# Patient Record
Sex: MALE | Race: WHITE | NOT HISPANIC OR LATINO | URBAN - METROPOLITAN AREA
[De-identification: names, ages, dates, MRNs, and addresses within clinical notes are randomized per-mention and may not be internally consistent; named-entity substitution may affect disease eponyms.]

---

## 2018-10-21 ENCOUNTER — EMERGENCY (EMERGENCY)
Facility: HOSPITAL | Age: 78
LOS: 1 days | Discharge: HOME | End: 2018-10-21
Attending: EMERGENCY MEDICINE | Admitting: EMERGENCY MEDICINE
Payer: COMMERCIAL

## 2018-10-21 VITALS
HEART RATE: 68 BPM | TEMPERATURE: 97 F | OXYGEN SATURATION: 98 % | SYSTOLIC BLOOD PRESSURE: 129 MMHG | RESPIRATION RATE: 16 BRPM | DIASTOLIC BLOOD PRESSURE: 75 MMHG

## 2018-10-21 VITALS
SYSTOLIC BLOOD PRESSURE: 147 MMHG | OXYGEN SATURATION: 98 % | HEART RATE: 60 BPM | RESPIRATION RATE: 18 BRPM | TEMPERATURE: 98 F | DIASTOLIC BLOOD PRESSURE: 85 MMHG

## 2018-10-21 PROCEDURE — 99284 EMERGENCY DEPT VISIT MOD MDM: CPT

## 2018-10-21 PROCEDURE — 74176 CT ABD & PELVIS W/O CONTRAST: CPT | Mod: 26

## 2018-10-21 RX ORDER — DIAZEPAM 5 MG
5 TABLET ORAL ONCE
Qty: 0 | Refills: 0 | Status: DISCONTINUED | OUTPATIENT
Start: 2018-10-21 | End: 2018-10-21

## 2018-10-21 RX ORDER — CYCLOBENZAPRINE HYDROCHLORIDE 10 MG/1
1 TABLET, FILM COATED ORAL
Qty: 6 | Refills: 0 | OUTPATIENT
Start: 2018-10-21 | End: 2018-10-23

## 2018-10-21 RX ORDER — CYCLOBENZAPRINE HYDROCHLORIDE 10 MG/1
10 TABLET, FILM COATED ORAL ONCE
Qty: 0 | Refills: 0 | Status: COMPLETED | OUTPATIENT
Start: 2018-10-21 | End: 2018-10-21

## 2018-10-21 RX ORDER — ACETAMINOPHEN WITH CODEINE 300MG-30MG
1 TABLET ORAL EVERY 4 HOURS
Qty: 0 | Refills: 0 | Status: DISCONTINUED | OUTPATIENT
Start: 2018-10-21 | End: 2018-10-21

## 2018-10-21 RX ORDER — ACETAMINOPHEN WITH CODEINE 300MG-30MG
1 TABLET ORAL ONCE
Qty: 0 | Refills: 0 | Status: DISCONTINUED | OUTPATIENT
Start: 2018-10-21 | End: 2018-10-21

## 2018-10-21 RX ADMIN — Medication 5 MILLIGRAM(S): at 14:37

## 2018-10-21 RX ADMIN — CYCLOBENZAPRINE HYDROCHLORIDE 10 MILLIGRAM(S): 10 TABLET, FILM COATED ORAL at 15:48

## 2018-10-21 RX ADMIN — Medication 1 TABLET(S): at 14:37

## 2018-10-21 RX ADMIN — Medication 5 MILLIGRAM(S): at 16:59

## 2018-10-21 RX ADMIN — Medication 1 TABLET(S): at 16:15

## 2018-10-21 RX ADMIN — Medication 1 TABLET(S): at 16:59

## 2018-10-21 NOTE — ED ADULT NURSE NOTE - CAS EDN DISCHARGE ASSESSMENT
Alert and oriented to person, place and time/felt better after 2nd dose of pain meds/Symptoms improved/Awake

## 2018-10-21 NOTE — ED PROVIDER NOTE - NEUROLOGICAL, MLM
Alert and oriented, no focal deficits, no motor or sensory deficits.  5/5 strength to B/L lower extremities with pain on L straight leg raise.

## 2018-10-21 NOTE — ED ADULT NURSE NOTE - CHIEF COMPLAINT
The patient is a 77y Male complaining of back pain/injury. pregnant & post-partum women during each pregancy irregardless of the patient's prior history of receiving Tdap to maximize the maternal antibody response and passive antibody transfer to the infant

## 2018-10-21 NOTE — ED PROVIDER NOTE - OBJECTIVE STATEMENT
76 y/o M with a PMHx of HLD, PE and DVT, GERD, HTN, achilles tendonitis, a Tetracycline allergy presents to ED c/o back spasms in his L lower back. Pt states that he went out to the park earlier, was standing ~1.5 hrs and then went into an uber sitting down with his knee bent. Thereafter walked to a racket club and suddenly experienced back spasms. Currently notes mild improvement of sx. 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 Denies fevers, chills, urinary sx, numbness, tingling, weakness, trauma to the area.    Medications: Xarelto 15 mg, Losartan 50 mg, Omeprazole 20 mg, Allopurinol 300 mg, Atorvastatin 20 mg, Tamsulosin 0.4 mg. 78 y/o M with a PMHx of HLD, PE and DVT, GERD, HTN, achilles tendonitis, a Tetracycline allergy presents to ED c/o back spasms in his L lower back. Pt states that he went out to the park earlier, was standing ~1.5 hrs and then went into an uber sitting down with his knee bent. Thereafter walked to a racket club and suddenly experienced back spasms. Currently notes mild improvement of sx. 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 Denies fevers, chills, urinary sx, numbness, tingling, weakness, trauma to the area.    PMD: Yordy Arshad at [place]  Medications: Xarelto 15 mg, Losartan 50 mg, Omeprazole 20 mg, Allopurinol 300 mg, Atorvastatin 20 mg, Tamsulosin 0.4 mg. 76 y/o M with a PMHx of HLD, PE and DVT, GERD, HTN, achilles tendonitis, a Tetracycline allergy presents to ED c/o back spasms in his L lower back. Pt states that he went out to the park earlier, was standing ~1.5 hrs and then went into an uber sitting down with his knee bent. Thereafter walked to a racket club and suddenly experienced back spasms. Currently notes mild improvement of sx. 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 Denies fevers, chills, urinary sx, numbness, tingling, weakness, exertion, trauma to the area.    PMD: Yordy Arshad at [place]  Medications: Xarelto 15 mg, Losartan 50 mg, Omeprazole 20 mg, Allopurinol 300 mg, Atorvastatin 20 mg, Tamsulosin 0.4 mg. 78 y/o M with a PMHx of HLD, PE and DVT, GERD, HTN, achilles tendonitis, a Tetracycline allergy presents to ED c/o back spasms in his L lower back. Pt states that he went out to the park earlier, was standing ~1.5 hrs and then went into an uber sitting down with his knee bent. Thereafter walked to a racket club and suddenly experienced back spasms. Currently notes mild improvement of sx. 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 Denies fevers, chills, urinary sx, numbness, tingling, weakness, exertion, trauma to the area.    PMD: Yordy Arshad at [place]  Medications: Xarelto 15 mg, Losartan 50 mg, Omeprazole 20 mg, Allopurinol 300 mg, Atorvastatin 20 mg, Tamsulosin 0.4 mg.

## 2018-10-21 NOTE — ED ADULT NURSE NOTE - OBJECTIVE STATEMENT
Pt with c/o Lt lower back spasm. Was walking down street and had to lower himself to the ground because he couldn't bearing own wait.

## 2018-10-21 NOTE — ED PROVIDER NOTE - PMH
DVT (deep venous thrombosis)    GERD (gastroesophageal reflux disease)    HLD (hyperlipidemia)    HTN (hypertension)    Pulmonary embolism

## 2018-10-21 NOTE — ED ADULT NURSE NOTE - NSIMPLEMENTINTERV_GEN_ALL_ED
Implemented All Fall Risk Interventions:  [place] to call system. Call bell, personal items and telephone within reach. Instruct patient to call for assistance. Room bathroom lighting operational. Non-slip footwear when patient is off stretcher. Physically safe environment: no spills, clutter or unnecessary equipment. Stretcher in lowest position, wheels locked, appropriate side rails in place. Provide visual cue, wrist band, yellow gown, etc. Monitor gait and stability. Monitor for mental status changes and reorient to person, place, and time. Review medications for side effects contributing to fall risk. Reinforce activity limits and safety measures with patient and family.

## 2018-10-25 DIAGNOSIS — I10 ESSENTIAL (PRIMARY) HYPERTENSION: ICD-10-CM

## 2018-10-25 DIAGNOSIS — E78.5 HYPERLIPIDEMIA, UNSPECIFIED: ICD-10-CM

## 2018-10-25 DIAGNOSIS — M54.42 LUMBAGO WITH SCIATICA, LEFT SIDE: ICD-10-CM

## 2018-10-25 DIAGNOSIS — Z88.8 ALLERGY STATUS TO OTHER DRUGS, MEDICAMENTS AND BIOLOGICAL SUBSTANCES: ICD-10-CM

## 2018-10-25 DIAGNOSIS — M54.5 LOW BACK PAIN: ICD-10-CM

## 2019-04-10 ENCOUNTER — EMERGENCY (EMERGENCY)
Facility: HOSPITAL | Age: 79
LOS: 1 days | Discharge: HOME | End: 2019-04-10
Attending: EMERGENCY MEDICINE | Admitting: EMERGENCY MEDICINE
Payer: COMMERCIAL

## 2019-04-10 VITALS
TEMPERATURE: 98 F | SYSTOLIC BLOOD PRESSURE: 152 MMHG | HEART RATE: 85 BPM | DIASTOLIC BLOOD PRESSURE: 92 MMHG | OXYGEN SATURATION: 95 % | RESPIRATION RATE: 18 BRPM

## 2019-04-10 DIAGNOSIS — I10 ESSENTIAL (PRIMARY) HYPERTENSION: ICD-10-CM

## 2019-04-10 DIAGNOSIS — I25.10 ATHEROSCLEROTIC HEART DISEASE OF NATIVE CORONARY ARTERY WITHOUT ANGINA PECTORIS: ICD-10-CM

## 2019-04-10 DIAGNOSIS — R07.9 CHEST PAIN, UNSPECIFIED: ICD-10-CM

## 2019-04-10 DIAGNOSIS — Z88.8 ALLERGY STATUS TO OTHER DRUGS, MEDICAMENTS AND BIOLOGICAL SUBSTANCES STATUS: ICD-10-CM

## 2019-04-10 DIAGNOSIS — R68.84 JAW PAIN: ICD-10-CM

## 2019-04-10 DIAGNOSIS — Z79.899 OTHER LONG TERM (CURRENT) DRUG THERAPY: ICD-10-CM

## 2019-04-10 DIAGNOSIS — M79.601 PAIN IN RIGHT ARM: ICD-10-CM

## 2019-04-10 DIAGNOSIS — E78.5 HYPERLIPIDEMIA, UNSPECIFIED: ICD-10-CM

## 2019-04-10 DIAGNOSIS — M25.521 PAIN IN RIGHT ELBOW: ICD-10-CM

## 2019-04-10 DIAGNOSIS — R07.89 OTHER CHEST PAIN: ICD-10-CM

## 2019-04-10 DIAGNOSIS — M25.511 PAIN IN RIGHT SHOULDER: ICD-10-CM

## 2019-04-10 LAB
ALBUMIN SERPL ELPH-MCNC: 4.2 G/DL — SIGNIFICANT CHANGE UP (ref 3.4–5)
ALP SERPL-CCNC: 68 U/L — SIGNIFICANT CHANGE UP (ref 40–120)
ALT FLD-CCNC: 52 U/L — HIGH (ref 12–42)
ANION GAP SERPL CALC-SCNC: 12 MMOL/L — SIGNIFICANT CHANGE UP (ref 9–16)
APTT BLD: 26.6 SEC — LOW (ref 27.5–36.3)
AST SERPL-CCNC: 36 U/L — SIGNIFICANT CHANGE UP (ref 15–37)
BASOPHILS NFR BLD AUTO: 0.6 % — SIGNIFICANT CHANGE UP (ref 0–2)
BILIRUB SERPL-MCNC: 0.5 MG/DL — SIGNIFICANT CHANGE UP (ref 0.2–1.2)
BUN SERPL-MCNC: 31 MG/DL — HIGH (ref 7–23)
CALCIUM SERPL-MCNC: 9.9 MG/DL — SIGNIFICANT CHANGE UP (ref 8.5–10.5)
CHLORIDE SERPL-SCNC: 106 MMOL/L — SIGNIFICANT CHANGE UP (ref 96–108)
CO2 SERPL-SCNC: 26 MMOL/L — SIGNIFICANT CHANGE UP (ref 22–31)
CREAT SERPL-MCNC: 1.62 MG/DL — HIGH (ref 0.5–1.3)
D DIMER BLD IA.RAPID-MCNC: 201 NG/ML DDU — SIGNIFICANT CHANGE UP
EOSINOPHIL NFR BLD AUTO: 4.1 % — SIGNIFICANT CHANGE UP (ref 0–6)
GLUCOSE SERPL-MCNC: 97 MG/DL — SIGNIFICANT CHANGE UP (ref 70–99)
HCT VFR BLD CALC: 40.3 % — SIGNIFICANT CHANGE UP (ref 39–50)
HGB BLD-MCNC: 13.3 G/DL — SIGNIFICANT CHANGE UP (ref 13–17)
IMM GRANULOCYTES NFR BLD AUTO: 0.3 % — SIGNIFICANT CHANGE UP (ref 0–1.5)
INR BLD: 0.93 — SIGNIFICANT CHANGE UP (ref 0.88–1.16)
LYMPHOCYTES # BLD AUTO: 38.7 % — SIGNIFICANT CHANGE UP (ref 13–44)
MCHC RBC-ENTMCNC: 32 PG — SIGNIFICANT CHANGE UP (ref 27–34)
MCHC RBC-ENTMCNC: 33 G/DL — SIGNIFICANT CHANGE UP (ref 32–36)
MCV RBC AUTO: 96.9 FL — SIGNIFICANT CHANGE UP (ref 80–100)
MONOCYTES NFR BLD AUTO: 9.9 % — SIGNIFICANT CHANGE UP (ref 2–14)
NEUTROPHILS NFR BLD AUTO: 46.4 % — SIGNIFICANT CHANGE UP (ref 43–77)
PLATELET # BLD AUTO: 162 K/UL — SIGNIFICANT CHANGE UP (ref 150–400)
POTASSIUM SERPL-MCNC: 3.9 MMOL/L — SIGNIFICANT CHANGE UP (ref 3.5–5.3)
POTASSIUM SERPL-SCNC: 3.9 MMOL/L — SIGNIFICANT CHANGE UP (ref 3.5–5.3)
PROT SERPL-MCNC: 7 G/DL — SIGNIFICANT CHANGE UP (ref 6.4–8.2)
PROTHROM AB SERPL-ACNC: 10.3 SEC — SIGNIFICANT CHANGE UP (ref 10–12.9)
RBC # BLD: 4.16 M/UL — LOW (ref 4.2–5.8)
RBC # FLD: 14.8 % — HIGH (ref 10.3–14.5)
SODIUM SERPL-SCNC: 144 MMOL/L — SIGNIFICANT CHANGE UP (ref 132–145)
TROPONIN I SERPL-MCNC: 0.02 NG/ML — SIGNIFICANT CHANGE UP (ref 0.02–0.06)
WBC # BLD: 9.3 K/UL — SIGNIFICANT CHANGE UP (ref 3.8–10.5)
WBC # FLD AUTO: 9.3 K/UL — SIGNIFICANT CHANGE UP (ref 3.8–10.5)

## 2019-04-10 PROCEDURE — 93010 ELECTROCARDIOGRAM REPORT: CPT | Mod: 77

## 2019-04-10 PROCEDURE — 93010 ELECTROCARDIOGRAM REPORT: CPT

## 2019-04-10 NOTE — ED ADULT NURSE NOTE - NSIMPLEMENTINTERV_GEN_ALL_ED
Implemented All Universal Safety Interventions:  [place] to call system. Call bell, personal items and telephone within reach. Instruct patient to call for assistance. Room bathroom lighting operational. Non-slip footwear when patient is off stretcher. Physically safe environment: no spills, clutter or unnecessary equipment. Stretcher in lowest position, wheels locked, appropriate side rails in place.

## 2019-04-10 NOTE — ED ADULT TRIAGE NOTE - CHIEF COMPLAINT QUOTE
Patient to ED with complaint of midsternal chest pain radiating to the right arm.  Patient denies SOB at this time

## 2019-04-10 NOTE — ED PROVIDER NOTE - CLINICAL SUMMARY MEDICAL DECISION MAKING FREE TEXT BOX
chest tightness, no hypoxia, no cp, no pleurisy, no tachycardia, hx of pe/dvt, hx of cad, will check dimer, cardiac panel, ekg, reassess

## 2019-04-10 NOTE — ED PROVIDER NOTE - PMH
CAD (coronary artery disease)    DVT (deep venous thrombosis)    GERD (gastroesophageal reflux disease)    HLD (hyperlipidemia)    HTN (hypertension)    Pulmonary embolism

## 2019-04-10 NOTE — ED PROVIDER NOTE - OBJECTIVE STATEMENT
78 yom chest tightness, states about 3 on a scale 10, sternal, no sob, no heaviness, no pain.  sx began around 9/10am during a meeting, then pt reports felt pain to right thenar, then also felt pain to right arm below the elbow, right shoulder, and left jaw, all of which do not occur at the same time.  tightness throughout the day, no modifying or eliciting factor.  pt walked here w/ a "decent pace" and did not experience any worsening sx.  hx of htn/hl/cad w/ 60% stenosis to one of the vessels, dx in 2003, when pt had indigestion/fullness sensation, seen at [facility], cath'd w/o stent, placed on bblocker/statin/asa.  pt also had dvt years ago in [place] (had black/blue bruise to leg and was dx w/o US, placed on SQ a/c, w/ neg subsequent duplex), in Sept 2018, pt had sob/chest tightness (more severe than today) and had elevated d-dimer and dx w/ PE via CTA, placed on xarelto, just finished a course and was discontinued on meds by hematologist 2 wk ago (also ran test and no obvious known hypercoagulable state, and had neg d-dimer test 2 wks ago).  pt states had cardiologist f/u last week and is due for stress test 4/22.  no abd pain, no back pain, no paresthesia to extremities.

## 2019-04-11 VITALS
DIASTOLIC BLOOD PRESSURE: 84 MMHG | OXYGEN SATURATION: 96 % | SYSTOLIC BLOOD PRESSURE: 154 MMHG | TEMPERATURE: 98 F | HEART RATE: 63 BPM | RESPIRATION RATE: 14 BRPM

## 2019-04-11 PROBLEM — I82.409 ACUTE EMBOLISM AND THROMBOSIS OF UNSPECIFIED DEEP VEINS OF UNSPECIFIED LOWER EXTREMITY: Chronic | Status: ACTIVE | Noted: 2018-10-21

## 2019-04-11 PROBLEM — I26.99 OTHER PULMONARY EMBOLISM WITHOUT ACUTE COR PULMONALE: Chronic | Status: ACTIVE | Noted: 2018-10-21

## 2019-04-11 PROBLEM — K21.9 GASTRO-ESOPHAGEAL REFLUX DISEASE WITHOUT ESOPHAGITIS: Chronic | Status: ACTIVE | Noted: 2018-10-21

## 2019-04-11 PROBLEM — I10 ESSENTIAL (PRIMARY) HYPERTENSION: Chronic | Status: ACTIVE | Noted: 2018-10-21

## 2019-04-11 PROBLEM — E78.5 HYPERLIPIDEMIA, UNSPECIFIED: Chronic | Status: ACTIVE | Noted: 2018-10-21

## 2019-04-11 LAB
TROPONIN I SERPL-MCNC: 0.02 NG/ML — SIGNIFICANT CHANGE UP (ref 0.02–0.06)
TROPONIN I SERPL-MCNC: 0.03 NG/ML — SIGNIFICANT CHANGE UP (ref 0.02–0.06)
TROPONIN I SERPL-MCNC: <0.017 NG/ML — LOW (ref 0.02–0.06)

## 2019-04-11 PROCEDURE — 75574 CT ANGIO HRT W/3D IMAGE: CPT | Mod: 26

## 2019-04-11 PROCEDURE — 99236 HOSP IP/OBS SAME DATE HI 85: CPT

## 2019-04-11 PROCEDURE — 99284 EMERGENCY DEPT VISIT MOD MDM: CPT

## 2019-04-11 RX ORDER — LOSARTAN POTASSIUM 100 MG/1
1 TABLET, FILM COATED ORAL
Qty: 0 | Refills: 0 | COMMUNITY

## 2019-04-11 RX ORDER — TAMSULOSIN HYDROCHLORIDE 0.4 MG/1
1 CAPSULE ORAL
Qty: 0 | Refills: 0 | COMMUNITY

## 2019-04-11 RX ORDER — OMEPRAZOLE 10 MG/1
1 CAPSULE, DELAYED RELEASE ORAL
Qty: 0 | Refills: 0 | COMMUNITY

## 2019-04-11 RX ORDER — BUPROPION HYDROCHLORIDE 150 MG/1
0 TABLET, EXTENDED RELEASE ORAL
Qty: 0 | Refills: 0 | COMMUNITY

## 2019-04-11 RX ORDER — ASPIRIN/CALCIUM CARB/MAGNESIUM 324 MG
325 TABLET ORAL ONCE
Qty: 0 | Refills: 0 | Status: COMPLETED | OUTPATIENT
Start: 2019-04-11 | End: 2019-04-11

## 2019-04-11 RX ORDER — ALLOPURINOL 300 MG
1 TABLET ORAL
Qty: 0 | Refills: 0 | COMMUNITY

## 2019-04-11 RX ORDER — NITROGLYCERIN 6.5 MG
0.4 CAPSULE, EXTENDED RELEASE ORAL ONCE
Qty: 0 | Refills: 0 | Status: COMPLETED | OUTPATIENT
Start: 2019-04-11 | End: 2019-04-11

## 2019-04-11 RX ORDER — CLOPIDOGREL BISULFATE 75 MG/1
300 TABLET, FILM COATED ORAL ONCE
Qty: 0 | Refills: 0 | Status: COMPLETED | OUTPATIENT
Start: 2019-04-11 | End: 2019-04-11

## 2019-04-11 RX ORDER — SODIUM CHLORIDE 9 MG/ML
1000 INJECTION INTRAMUSCULAR; INTRAVENOUS; SUBCUTANEOUS ONCE
Qty: 0 | Refills: 0 | Status: COMPLETED | OUTPATIENT
Start: 2019-04-11 | End: 2019-04-11

## 2019-04-11 RX ORDER — ATORVASTATIN CALCIUM 80 MG/1
1 TABLET, FILM COATED ORAL
Qty: 0 | Refills: 0 | COMMUNITY

## 2019-04-11 RX ORDER — ACETAMINOPHEN 500 MG
975 TABLET ORAL ONCE
Qty: 0 | Refills: 0 | Status: COMPLETED | OUTPATIENT
Start: 2019-04-11 | End: 2019-04-11

## 2019-04-11 RX ADMIN — Medication 0.4 MILLIGRAM(S): at 09:15

## 2019-04-11 RX ADMIN — SODIUM CHLORIDE 1000 MILLILITER(S): 9 INJECTION INTRAMUSCULAR; INTRAVENOUS; SUBCUTANEOUS at 13:58

## 2019-04-11 RX ADMIN — SODIUM CHLORIDE 2000 MILLILITER(S): 9 INJECTION INTRAMUSCULAR; INTRAVENOUS; SUBCUTANEOUS at 08:31

## 2019-04-11 RX ADMIN — Medication 325 MILLIGRAM(S): at 14:19

## 2019-04-11 RX ADMIN — CLOPIDOGREL BISULFATE 300 MILLIGRAM(S): 75 TABLET, FILM COATED ORAL at 14:34

## 2019-04-11 RX ADMIN — SODIUM CHLORIDE 250 MILLILITER(S): 9 INJECTION INTRAMUSCULAR; INTRAVENOUS; SUBCUTANEOUS at 15:48

## 2019-04-11 NOTE — ED CDU PROVIDER INITIAL DAY NOTE - MEDICAL DECISION MAKING DETAILS
minimally increased trop, rpt ekg similar to prior, d/w patient, will attempt to reach pmd in the AM, consider CT coronary

## 2019-04-11 NOTE — ED CDU PROVIDER DISPOSITION NOTE - NSBENEFITOFTRANSFER_ED_A_ED
Obtain Level of Care/Service Not Available at this Facility/Continuity of Care at Other Facility/Worsening of Condition, Death, or Disability if Patient Does Not Transfer

## 2019-04-11 NOTE — ED CDU PROVIDER INITIAL DAY NOTE - OBJECTIVE STATEMENT
78 yom chest tightness, states about 3 on a scale 10, sternal, no sob, no heaviness, no pain.  sx began around 9/10am during a meeting, then pt reports felt pain to right thenar, then also felt pain to right arm below the elbow, right shoulder, and left jaw, all of which do not occur at the same time.  tightness throughout the day, no modifying or eliciting factor.  pt walked here w/ a "decent pace" and did not experience any worsening sx.  hx of htn/hl/cad w/ 60% stenosis to one of the vessels, dx in 2003, when pt had indigestion/fullness sensation, seen at [facility], cath'd w/o stent, placed on bblocker/statin/asa.  pt also had dvt years ago in [place] (had black/blue bruise to leg and was dx w/o US, placed on SQ a/c, w/ neg subsequent duplex), in Sept 2018, pt had sob/chest tightness (more severe than today) and had elevated d-dimer and dx w/ PE via CTA, placed on xarelto, just finished a course and was discontinued on meds by hematologist 2 wk ago (also ran test and no obvious known hypercoagulable state, and had neg d-dimer test 2 wks ago).  pt states had cardiologist f/u last week and is due for stress test 4/22.  no abd pain, no back pain, no paresthesia to extremities.

## 2019-04-11 NOTE — ED ADULT NURSE REASSESSMENT NOTE - NS ED NURSE REASSESS COMMENT FT1
pt informed awaiting transfer to [facility]- Family remains with patient at this time. No complaints offered at this time.

## 2019-04-11 NOTE — ED CDU PROVIDER DISPOSITION NOTE - CLINICAL COURSE
Patient presenting with vague chest pains, trops essential neg. CTAC shows significant CAD. Mild ongoing chest sx. For admission for cath to [facility].

## 2019-04-11 NOTE — ED CDU PROVIDER DISPOSITION NOTE - NSREASONFORTRANSFER_ED_A_ED
Higher Level of Care or Service Not Available/Patient Request/PMD Request/No Available Appropriate Bed at this Facility/Pre-existing Relationship

## 2019-04-11 NOTE — ED CDU PROVIDER INITIAL DAY NOTE - PROGRESS NOTE DETAILS
CTAC done, results pending. Calling Dr. Davidson- three calls placed and left messages. Call in progress to [facility] for admission to Dr. Davidson. She will be accepting. Private room if possible tele. They will check back shortly re. bed. Patient having 1.5/10 pain. Trops essentially neg x 3. Will give another L NS slowly as Cr. was 1.62.

## 2019-04-11 NOTE — CONSULT NOTE ADULT - SUBJECTIVE AND OBJECTIVE BOX
[facility] Cardiology Consultation    CHIEF COMPLAINT: CP    HISTORY OF PRESENT ILLNESS: 77 y/o male with several days of chest discomfort. The pain was midsternal and pressure like in nature. Symptoms associated with fatigue and dyspnea. He underwent a cardiac cta. The study showed an elevated Ca score and several lesions. We are discussing further course of action.     PAST MEDICAL & SURGICAL HISTORY:  CAD (coronary artery disease)  HLD (hyperlipidemia)  GERD (gastroesophageal reflux disease)  HTN (hypertension)  Pulmonary embolism  DVT (deep venous thrombosis)  No significant past surgical history    Allergies Tetracon (Unknown)    MEDICATIONS:  ASA  Simvastatin  Allopurinal    FAMILY HISTORY:  No pertinent family history in first degree relatives    SOCIAL HISTORY:  no EtOH, tobacco or drug use    REVIEW OF SYSTEMS:  CONSTITUTIONAL: No fever, weight loss, or fatigue  EYES: No eye pain, visual disturbances, or discharge  ENMT:  No difficulty hearing, tinnitus, vertigo; No sinus or throat pain  NECK: No pain or stiffness  BREASTS: No pain, masses, or nipple discharge  RESPIRATORY: No cough, wheezing, chills or hemoptysis; No Shortness of Breath  CARDIOVASCULAR: No chest pain, palpitations, dizziness, or leg swelling  GASTROINTESTINAL: No abdominal or epigastric pain. No nausea, vomiting, or hematemesis; No diarrhea or constipation. No melena or hematochezia.  GENITOURINARY: No dysuria, frequency, hematuria, or incontinence  NEUROLOGICAL: No headaches, memory loss, loss of strength, numbness, or tremors  SKIN: No itching, burning, rashes, or lesions   LYMPH Nodes: No enlarged glands  ENDOCRINE: No heat or cold intolerance; No hair loss  MUSCULOSKELETAL: No joint pain or swelling; No muscle, back, or extremity pain  PSYCHIATRIC: No depression, anxiety, mood swings, or difficulty sleeping  HEME/LYMPH: No easy bruising, or bleeding gums  ALLERY AND IMMUNOLOGIC: No hives or eczema	      PHYSICAL EXAM:  T(C): 36.4 (04-11-19 @ 09:57), Max: 36.9 (04-11-19 @ 00:19)  HR: 60 (04-11-19 @ 09:57) (50 - 85)  BP: 121/66 (04-11-19 @ 09:57) (121/66 - 152/92)  RR: 16 (04-11-19 @ 09:57) (16 - 18)  SpO2: 99% (04-11-19 @ 09:57) (95% - 99%)      Appearance: elderly man NAD  HEENT:   Normal oral mucosa, PERRL, EOMI	  Lymphatic: No lymphadenopathy  Cardiovascular: Normal S1 S2, No JVD, No murmurs, No edema  Respiratory: Lungs clear to auscultation	  Psychiatry: A & O x 3, Mood & affect appropriate  Gastrointestinal:  Soft, Non-tender, + BS	  Skin: No rashes, No ecchymoses, No cyanosis	  Neurologic: Non-focal  Extremities: Normal range of motion, No clubbing, cyanosis or edema  Vascular: Peripheral pulses palpable 2+ bilaterally  	    ECG:  	SR non-specific abnormalities    LABS:	 	  CARDIAC MARKERS:  Troponin I, Serum: <0.017 ng/mL (04-11 @ 06:13)  Troponin I, Serum: 0.032 ng/mL (04-10 @ 23:59)  Troponin I, Serum: 0.020 ng/mL (04-10 @ 21:38)                          13.3   9.3   )-----------( 162      ( 10 Apr 2019 21:38 )             40.3     04-10    144  |  106  |  31<H>  ----------------------------<  97  3.9   |  26  |  1.62<H>    Ca    9.9      10 Apr 2019 21:38    TPro  7.0  /  Alb  4.2  /  TBili  0.5  /  DBili  x   /  AST  36  /  ALT  52<H>  /  AlkPhos  68  04-10      ASSESSMENT/PLAN: 	77 y/o male with chest discomfort and abnormal CCTA  1. patient seen and examined, chart reviewed  2. ASA given  3. CCTA reviewed  4. recommend a cardiac angiogram to further evaluate  5. will arrange for a transfer    I spent 45 min in care of this patient

## 2019-06-12 ENCOUNTER — EMERGENCY (EMERGENCY)
Facility: HOSPITAL | Age: 79
LOS: 1 days | Discharge: HOME | End: 2019-06-12
Attending: EMERGENCY MEDICINE | Admitting: EMERGENCY MEDICINE
Payer: COMMERCIAL

## 2019-06-12 VITALS
SYSTOLIC BLOOD PRESSURE: 133 MMHG | RESPIRATION RATE: 18 BRPM | DIASTOLIC BLOOD PRESSURE: 78 MMHG | HEART RATE: 76 BPM | OXYGEN SATURATION: 99 %

## 2019-06-12 VITALS
DIASTOLIC BLOOD PRESSURE: 85 MMHG | SYSTOLIC BLOOD PRESSURE: 144 MMHG | OXYGEN SATURATION: 100 % | HEART RATE: 74 BPM | RESPIRATION RATE: 18 BRPM

## 2019-06-12 PROBLEM — I25.10 ATHEROSCLEROTIC HEART DISEASE OF NATIVE CORONARY ARTERY WITHOUT ANGINA PECTORIS: Chronic | Status: ACTIVE | Noted: 2019-04-10

## 2019-06-12 LAB
APTT BLD: 32.2 SEC — SIGNIFICANT CHANGE UP (ref 27.5–36.3)
HCT VFR BLD CALC: 38.4 % — LOW (ref 39–50)
HGB BLD-MCNC: 12.9 G/DL — LOW (ref 13–17)
INR BLD: 1.06 — SIGNIFICANT CHANGE UP (ref 0.88–1.16)
MCHC RBC-ENTMCNC: 32.9 PG — SIGNIFICANT CHANGE UP (ref 27–34)
MCHC RBC-ENTMCNC: 33.6 G/DL — SIGNIFICANT CHANGE UP (ref 32–36)
MCV RBC AUTO: 98 FL — SIGNIFICANT CHANGE UP (ref 80–100)
PLATELET # BLD AUTO: 136 K/UL — LOW (ref 150–400)
PROTHROM AB SERPL-ACNC: 11.8 SEC — SIGNIFICANT CHANGE UP (ref 10–12.9)
RBC # BLD: 3.92 M/UL — LOW (ref 4.2–5.8)
RBC # FLD: 14.1 % — SIGNIFICANT CHANGE UP (ref 10.3–14.5)
WBC # BLD: 9.9 K/UL — SIGNIFICANT CHANGE UP (ref 3.8–10.5)
WBC # FLD AUTO: 9.9 K/UL — SIGNIFICANT CHANGE UP (ref 3.8–10.5)

## 2019-06-12 PROCEDURE — 99284 EMERGENCY DEPT VISIT MOD MDM: CPT | Mod: 25

## 2019-06-12 RX ORDER — TRANEXAMIC ACID 100 MG/ML
5 INJECTION, SOLUTION INTRAVENOUS ONCE
Refills: 0 | Status: COMPLETED | OUTPATIENT
Start: 2019-06-12 | End: 2019-06-12

## 2019-06-12 RX ADMIN — TRANEXAMIC ACID 5 MILLILITER(S): 100 INJECTION, SOLUTION INTRAVENOUS at 00:28

## 2019-06-12 NOTE — ED ADULT NURSE NOTE - CHPI ED NUR SYMPTOMS NEG
no vomiting/no chills/no nausea/no decreased eating/drinking/no dizziness/no weakness/no fever/no pain/no tingling

## 2019-06-12 NOTE — ED PROVIDER NOTE - CLINICAL SUMMARY MEDICAL DECISION MAKING FREE TEXT BOX
gum bleeding after routine cleaning, on eliquis, trial of swish/spit txa, can consider surgicel/silver nitrate if not successful

## 2019-06-12 NOTE — ED PROVIDER NOTE - OBJECTIVE STATEMENT
78 yom pw gum bleeding, right sided, lower, after routine cleaning at dentist.  on eliquis.  denies other trauma.  no pain.

## 2019-06-12 NOTE — ED PROVIDER NOTE - NSFOLLOWUPINSTRUCTIONS_ED_ALL_ED_FT
Follow up with your dentist in the morning  If you get worse after going home, call [facility] ER to see if they have any dental emergency/residents available for evaluation  Return here if you get worse or if [facility] is not available for your care  Return immediately for any new or worsening symptoms or any new concerns

## 2019-06-12 NOTE — ED PROVIDER NOTE - PHYSICAL EXAMINATION
CON: ao x 3, HENMT: clear oropharynx, soft neck, oozing noted to right lower gum, no obvious laceration, no gum swelling, HEAD: atraumatic, SKIN: no rash, MSK: no deformities

## 2019-06-16 DIAGNOSIS — K06.8 OTHER SPECIFIED DISORDERS OF GINGIVA AND EDENTULOUS ALVEOLAR RIDGE: ICD-10-CM

## 2020-05-07 NOTE — ED ADULT NURSE NOTE - IS THE PATIENT ABLE TO BE SCREENED?
Calling regarding:Kajal from [facility] calling to speak with nurse concerning pt's wound and a med that she thinks might help. Pls advise         Caller: Kajal    Timeframe for callback: today    Pharmacy information verified:  No     Phone number to be reached at: [phone] Kajal       Yes

## 2020-12-30 DIAGNOSIS — C61 MALIGNANT NEOPLASM OF PROSTATE: ICD-10-CM

## 2020-12-30 PROBLEM — Z00.00 ENCOUNTER FOR PREVENTIVE HEALTH EXAMINATION: Status: ACTIVE | Noted: 2020-12-30

## 2021-01-13 ENCOUNTER — APPOINTMENT (OUTPATIENT)
Dept: RADIATION ONCOLOGY | Facility: CLINIC | Age: 81
End: 2021-01-13
Payer: SELF-PAY

## 2021-01-13 PROCEDURE — 99442: CPT

## 2021-01-13 RX ORDER — BUPROPION HYDROCHLORIDE 150 MG/1
150 TABLET, FILM COATED, EXTENDED RELEASE ORAL
Refills: 0 | Status: ACTIVE | COMMUNITY

## 2021-01-13 RX ORDER — TADALAFIL 5 MG/1
TABLET, FILM COATED ORAL
Refills: 0 | Status: ACTIVE | COMMUNITY

## 2021-01-13 RX ORDER — APIXABAN 5 MG/1
5 TABLET, FILM COATED ORAL
Refills: 0 | Status: ACTIVE | COMMUNITY

## 2021-01-13 RX ORDER — METOPROLOL TARTRATE 25 MG/1
25 TABLET, FILM COATED ORAL
Refills: 0 | Status: ACTIVE | COMMUNITY

## 2021-01-13 RX ORDER — ATORVASTATIN CALCIUM 80 MG/1
TABLET, FILM COATED ORAL
Refills: 0 | Status: ACTIVE | COMMUNITY

## 2021-01-13 RX ORDER — ALLOPURINOL 200 MG/1
TABLET ORAL
Refills: 0 | Status: ACTIVE | COMMUNITY

## 2021-01-13 RX ORDER — TAMSULOSIN HYDROCHLORIDE 0.4 MG/1
0.4 CAPSULE ORAL
Refills: 0 | Status: ACTIVE | COMMUNITY

## 2021-01-13 RX ORDER — OMEPRAZOLE MAGNESIUM 20 MG/1
TABLET, DELAYED RELEASE ORAL
Refills: 0 | Status: ACTIVE | COMMUNITY

## 2021-01-13 NOTE — HISTORY OF PRESENT ILLNESS
[FreeTextEntry1] : Mr. 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 On 1/24/18, he completed a course of definitive stereotactic body radiation therapy to the prostate using Cyberknife technology for a total of 3625 cGy over 5 fractions. He declined ADT as a component of his treatment. \par \par 1/13/21- FOLLOW UP\par Patient follows up today via Telehealth. At his most recent follow up on 7/1/2020, he was doing well with no biochemical evidence of disease progression. He notes that he is feeling well, with no appreciable symptomatology related to his definitive radiation therapy.  Specifically, he notes a sustained improvement in baseline urine function with nocturia x2, while still using flomax 0.4mg at night.  He denies dysuria or incontinence.  He denies blood in the urine.  He has no blood or mucous in the stool, and denies rectal pain.  He continues to have erections and uses Cialis on a PRN basis, with expected decrease in ejaculate.  He notes he saw Dr. Nagy in September, at which time he had a benign BRYANT and had urodynamic testing.  He is due to see his primary care physician.  His PSA was 0.4ng/ml on 1/7/21. \par \par

## 2021-01-13 NOTE — DISEASE MANAGEMENT
[1] : T1 [c] : c [0-10] : 0 -10 ng/mL [7(3+4)] : Zuhair Score 7(3+4) [Treatment with radiation therapy] : Treatment with radiation therapy [EBRT] : EBRT [IIB] : IIB [RadiationCompletedDate] : 1/24/18 [EBRTDose] : 3625 cGy [EBRTFractions] : 5

## 2021-02-18 ENCOUNTER — NON-APPOINTMENT (OUTPATIENT)
Age: 81
End: 2021-02-18

## 2021-02-18 ENCOUNTER — APPOINTMENT (OUTPATIENT)
Dept: OPHTHALMOLOGY | Facility: CLINIC | Age: 81
End: 2021-02-18

## 2021-08-23 NOTE — ED PROVIDER NOTE - RESPIRATORY NEGATIVE STATEMENT, MLM
Patient make medication changes as instructed  And schedule follow-up appointment. Should call to be seen sooner should any problem arise with medication changes or if anticipated improvement does not occur.   no chest pain, no cough, and no shortness of breath.

## 2021-12-01 ENCOUNTER — APPOINTMENT (OUTPATIENT)
Dept: RADIATION ONCOLOGY | Facility: CLINIC | Age: 81
End: 2021-12-01
Payer: COMMERCIAL

## 2021-12-01 VITALS
SYSTOLIC BLOOD PRESSURE: 134 MMHG | RESPIRATION RATE: 18 BRPM | HEART RATE: 56 BPM | OXYGEN SATURATION: 96 % | TEMPERATURE: 98.5 F | WEIGHT: 253.3 LBS | DIASTOLIC BLOOD PRESSURE: 67 MMHG

## 2021-12-01 PROCEDURE — 99213 OFFICE O/P EST LOW 20 MIN: CPT

## 2021-12-06 NOTE — PHYSICAL EXAM
[Normal] : normal spine exam without palpable tenderness, no kyphosis or scoliosis [FreeTextEntry1] : prostate flat, with no induration or nodularity; no blood on examiner's finger.

## 2021-12-06 NOTE — DISEASE MANAGEMENT
[1] : T1 [c] : c [0-10] : 0 -10 ng/mL [IIB] : IIB [Treatment with radiation therapy] : Treatment with radiation therapy [EBRT] : EBRT [RadiationCompletedDate] : 1/24/18 [EBRTDose] : 3625 cGy [EBRTFractions] : 5

## 2021-12-06 NOTE — HISTORY OF PRESENT ILLNESS
[FreeTextEntry1] : Mr. 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 On 1/24/18, he completed a course of definitive stereotactic body radiation therapy to the prostate using Cyberknife technology for a total of 3625 cGy over 5 fractions. He declined ADT as a component of his treatment. \par \par 12/01/2021- RPA- He presents today for follow-up and states he overall feels well. He notes some increased urgency over the last few months, with two episodes of urge incontinence.  He continues to experience baseline nocturia x2-3, while using Flomax 0.4mg at night.  He denies dysuria. He has a varied urine flow from weak to medium and c/o increased urinary frequency or urgency for the past few months. He denies blood in the urine.  He has no blood or mucous in the stool, and denies rectal pain.  He denied ADT therapy in the past. He is able to have average quality erections and uses Cialis as an ED medication with good result. He last followed up with Dr. Escobar regarding a cough on 10/14/21. His PSA was 0.4ng/ml on 1/7/21. His PSA was 0.29 ng/ml on 10/14/21.\par \par \par 1/13/21- FOLLOW UP\par Patient follows up today via Telehealth. At his most recent follow up on 7/1/2020, he was doing well with no biochemical evidence of disease progression. He notes that he is feeling well, with no appreciable symptomatology related to his definitive radiation therapy.  Specifically, he notes a sustained improvement in baseline urine function with nocturia x2, while still using flomax 0.4mg at night.  He denies dysuria or incontinence.  He denies blood in the urine.  He has no blood or mucous in the stool, and denies rectal pain.  He continues to have erections and uses Cialis on a PRN basis, with expected decrease in ejaculate.  He notes he saw Dr. Nagy in September, at which time he had a benign BRYANT and had urodynamic testing.  He is due to see his primary care physician.  His PSA was 0.4ng/ml on 1/7/21. \par \par

## 2021-12-15 ENCOUNTER — TRANSCRIPTION ENCOUNTER (OUTPATIENT)
Age: 81
End: 2021-12-15

## 2022-03-07 ENCOUNTER — NON-APPOINTMENT (OUTPATIENT)
Age: 82
End: 2022-03-07

## 2022-03-14 ENCOUNTER — APPOINTMENT (OUTPATIENT)
Dept: OPHTHALMOLOGY | Facility: CLINIC | Age: 82
End: 2022-03-14

## 2022-03-14 ENCOUNTER — NON-APPOINTMENT (OUTPATIENT)
Age: 82
End: 2022-03-14

## 2022-04-06 ENCOUNTER — NON-APPOINTMENT (OUTPATIENT)
Age: 82
End: 2022-04-06

## 2022-04-06 ENCOUNTER — APPOINTMENT (OUTPATIENT)
Dept: OPHTHALMOLOGY | Facility: CLINIC | Age: 82
End: 2022-04-06
Payer: COMMERCIAL

## 2022-04-06 PROCEDURE — 92014 COMPRE OPH EXAM EST PT 1/>: CPT

## 2022-04-06 PROCEDURE — 92250 FUNDUS PHOTOGRAPHY W/I&R: CPT

## 2022-06-01 ENCOUNTER — APPOINTMENT (OUTPATIENT)
Dept: RADIATION ONCOLOGY | Facility: CLINIC | Age: 82
End: 2022-06-01
Payer: COMMERCIAL

## 2022-06-01 PROCEDURE — 99442: CPT | Mod: 95

## 2022-06-01 NOTE — HISTORY OF PRESENT ILLNESS
[FreeTextEntry1] : Mr. 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 On 1/24/18, he completed a course of definitive stereotactic body radiation therapy to the prostate using Cyberknife technology for a total of 3625 cGy over 5 fractions. He declined ADT as a component of his treatment. \par \par 06/01/2022- RPA- He presents today for follow-up and states he overall feels well.  He notes stable baseline urinary function with the use of Flomax 0.4 mg twice daily.  He indicates that on average he experiences nocturia x2-3.  He notes that he experienced more urgency over the weekend while he was on vacation, though he notes that prior to this weekend and since he has not experienced this.  He does not wear diapers or pads.  He denies dysuria or blood in the urine.  He denies any blood or mucus in the stool.  He has had diminished erectile function which he believes might be related to his continued use of beta-blocker medication.  He notes that he experimented not taking metoprolol for 3 days and had increased sexual function.  He is planning on reviewing with his cardiologist potential alternative medication if this is feasible.  He will schedule follow-up with Dr. Nagy in the near future.  His PSA was 0.4 ng/ml on 1/7/21,  0.29 ng/ml on 10/14/21,  0.30 ng/ml on 4/8/22.\par \par 12/01/2021- RPA- He presents today for follow-up and states he overall feels well. He notes some increased urgency over the last few months, with two episodes of urge incontinence.  He continues to experience baseline nocturia x2-3, while using Flomax 0.4mg at night.  He denies dysuria. He has a varied urine flow from weak to medium and c/o increased urinary frequency or urgency for the past few months. He denies blood in the urine.  He has no blood or mucous in the stool, and denies rectal pain.  He denied ADT therapy in the past. He is able to have average quality erections and uses Cialis as an ED medication with good result. He last followed up with Dr. Escobar regarding a cough on 10/14/21. His PSA was 0.4ng/ml on 1/7/21. His PSA was 0.29 ng/ml on 10/14/21.\par \par \par 1/13/21- FOLLOW UP\par Patient follows up today via Telehealth. At his most recent follow up on 7/1/2020, he was doing well with no biochemical evidence of disease progression. He notes that he is feeling well, with no appreciable symptomatology related to his definitive radiation therapy.  Specifically, he notes a sustained improvement in baseline urine function with nocturia x2, while still using flomax 0.4mg at night.  He denies dysuria or incontinence.  He denies blood in the urine.  He has no blood or mucous in the stool, and denies rectal pain.  He continues to have erections and uses Cialis on a PRN basis, with expected decrease in ejaculate.  He notes he saw Dr. Nagy in September, at which time he had a benign BRYANT and had urodynamic testing.  He is due to see his primary care physician.  His PSA was 0.4ng/ml on 1/7/21. \par \par

## 2022-08-22 ENCOUNTER — NON-APPOINTMENT (OUTPATIENT)
Age: 82
End: 2022-08-22

## 2022-08-22 ENCOUNTER — APPOINTMENT (OUTPATIENT)
Dept: OPHTHALMOLOGY | Facility: CLINIC | Age: 82
End: 2022-08-22

## 2022-08-22 PROCEDURE — 92012 INTRM OPH EXAM EST PATIENT: CPT

## 2022-08-22 PROCEDURE — 92250 FUNDUS PHOTOGRAPHY W/I&R: CPT

## 2023-03-16 ENCOUNTER — NON-APPOINTMENT (OUTPATIENT)
Age: 83
End: 2023-03-16

## 2023-03-16 ENCOUNTER — APPOINTMENT (OUTPATIENT)
Dept: OPHTHALMOLOGY | Facility: CLINIC | Age: 83
End: 2023-03-16

## 2023-08-28 ENCOUNTER — NON-APPOINTMENT (OUTPATIENT)
Age: 83
End: 2023-08-28

## 2023-08-28 ENCOUNTER — APPOINTMENT (OUTPATIENT)
Dept: OPHTHALMOLOGY | Facility: CLINIC | Age: 83
End: 2023-08-28
Payer: COMMERCIAL

## 2023-08-28 PROCEDURE — 92134 CPTRZ OPH DX IMG PST SGM RTA: CPT

## 2023-08-28 PROCEDURE — 92014 COMPRE OPH EXAM EST PT 1/>: CPT

## 2024-03-12 ENCOUNTER — APPOINTMENT (OUTPATIENT)
Dept: OPHTHALMOLOGY | Facility: CLINIC | Age: 84
End: 2024-03-12

## 2024-03-12 ENCOUNTER — NON-APPOINTMENT (OUTPATIENT)
Age: 84
End: 2024-03-12

## 2024-08-28 ENCOUNTER — NON-APPOINTMENT (OUTPATIENT)
Age: 84
End: 2024-08-28

## 2024-08-28 ENCOUNTER — APPOINTMENT (OUTPATIENT)
Dept: OPHTHALMOLOGY | Facility: CLINIC | Age: 84
End: 2024-08-28
Payer: COMMERCIAL

## 2024-08-28 PROCEDURE — 92014 COMPRE OPH EXAM EST PT 1/>: CPT

## 2024-08-28 PROCEDURE — 92134 CPTRZ OPH DX IMG PST SGM RTA: CPT

## 2025-03-13 ENCOUNTER — APPOINTMENT (OUTPATIENT)
Dept: OPHTHALMOLOGY | Facility: CLINIC | Age: 85
End: 2025-03-13

## 2025-03-13 ENCOUNTER — NON-APPOINTMENT (OUTPATIENT)
Age: 85
End: 2025-03-13

## 2025-04-17 ENCOUNTER — APPOINTMENT (OUTPATIENT)
Dept: OPHTHALMOLOGY | Facility: CLINIC | Age: 85
End: 2025-04-17